# Patient Record
Sex: MALE | Race: WHITE | Employment: FULL TIME | URBAN - NONMETROPOLITAN AREA
[De-identification: names, ages, dates, MRNs, and addresses within clinical notes are randomized per-mention and may not be internally consistent; named-entity substitution may affect disease eponyms.]

---

## 2017-08-23 ENCOUNTER — APPOINTMENT (OUTPATIENT)
Dept: GENERAL RADIOLOGY | Age: 22
End: 2017-08-23
Payer: COMMERCIAL

## 2017-08-23 ENCOUNTER — HOSPITAL ENCOUNTER (EMERGENCY)
Age: 22
Discharge: HOME | End: 2017-08-23
Payer: COMMERCIAL

## 2017-08-23 VITALS
DIASTOLIC BLOOD PRESSURE: 73 MMHG | HEART RATE: 75 BPM | OXYGEN SATURATION: 98 % | RESPIRATION RATE: 18 BRPM | BODY MASS INDEX: 20.99 KG/M2 | HEIGHT: 72 IN | TEMPERATURE: 98.6 F | SYSTOLIC BLOOD PRESSURE: 116 MMHG | WEIGHT: 155 LBS

## 2017-08-23 DIAGNOSIS — S39.012D LUMBAR STRAIN, SUBSEQUENT ENCOUNTER: Primary | ICD-10-CM

## 2017-08-23 PROCEDURE — 99283 EMERGENCY DEPT VISIT LOW MDM: CPT

## 2017-08-23 PROCEDURE — 72100 X-RAY EXAM L-S SPINE 2/3 VWS: CPT

## 2017-08-23 RX ORDER — METHYLPREDNISOLONE 4 MG/1
TABLET ORAL
Qty: 1 KIT | Refills: 0 | Status: SHIPPED | OUTPATIENT
Start: 2017-08-23 | End: 2017-08-29

## 2017-08-23 RX ORDER — CYCLOBENZAPRINE HCL 10 MG
5-10 TABLET ORAL 3 TIMES DAILY PRN
Qty: 15 TABLET | Refills: 0 | Status: SHIPPED | OUTPATIENT
Start: 2017-08-23 | End: 2017-09-02

## 2017-08-23 ASSESSMENT — ENCOUNTER SYMPTOMS
SHORTNESS OF BREATH: 0
WHEEZING: 0
DIARRHEA: 0
SORE THROAT: 0
COUGH: 0
VOMITING: 0
ABDOMINAL PAIN: 0
BLOOD IN STOOL: 0
BACK PAIN: 1
NAUSEA: 0

## 2017-08-23 ASSESSMENT — PAIN DESCRIPTION - PAIN TYPE: TYPE: ACUTE PAIN

## 2017-08-23 ASSESSMENT — PAIN DESCRIPTION - FREQUENCY: FREQUENCY: CONTINUOUS

## 2017-08-23 ASSESSMENT — PAIN DESCRIPTION - DESCRIPTORS: DESCRIPTORS: CONSTANT

## 2017-08-23 ASSESSMENT — PAIN DESCRIPTION - ORIENTATION: ORIENTATION: LOWER;RIGHT;LEFT

## 2017-08-23 ASSESSMENT — PAIN SCALES - GENERAL: PAINLEVEL_OUTOF10: 7

## 2017-08-23 ASSESSMENT — PAIN DESCRIPTION - LOCATION: LOCATION: BACK

## 2021-10-30 LAB
C-REACTIVE PROTEIN: 9.9 MG/DL (ref 0–1)
ERYTHROCYTE SEDIMENTATION RATE: 20 MM/HR (ref 0–15)

## 2023-06-08 ENCOUNTER — CLINICAL DOCUMENTATION (OUTPATIENT)
Dept: INTERNAL MEDICINE CLINIC | Age: 28
End: 2023-06-08

## 2023-06-08 NOTE — PROGRESS NOTES
Patient contacted suzie to complete new patient screening. Patient was  referred by [facility]. Patient reports that he is active in IOP with [facility]. Patient reports that he has been using heroin/ fentanyl since he was 20. Patient reports that he hasn't been using very much lately. Patient reports taking suboxone off the street 8mg 2 weeks ago. Patient reports that he used yesterday about a tenth.  Suzie was able to schedule on 6/12@ 2:30p

## 2023-06-19 ENCOUNTER — OFFICE VISIT (OUTPATIENT)
Dept: INTERNAL MEDICINE CLINIC | Age: 28
End: 2023-06-19
Payer: COMMERCIAL

## 2023-06-19 VITALS
WEIGHT: 164 LBS | SYSTOLIC BLOOD PRESSURE: 119 MMHG | HEIGHT: 73 IN | BODY MASS INDEX: 21.74 KG/M2 | DIASTOLIC BLOOD PRESSURE: 77 MMHG | HEART RATE: 57 BPM

## 2023-06-19 DIAGNOSIS — F11.20 SEVERE OPIOID USE DISORDER (HCC): Primary | ICD-10-CM

## 2023-06-19 LAB
ALCOHOL URINE: ABNORMAL
AMPHETAMINE SCREEN, URINE: ABNORMAL
BARBITURATE SCREEN, URINE: ABNORMAL
BENZODIAZEPINE SCREEN, URINE: ABNORMAL
BUPRENORPHINE URINE: ABNORMAL
COCAINE METABOLITE SCREEN URINE: ABNORMAL
FENTANYL SCREEN, URINE: ABNORMAL
GABAPENTIN SCREEN, URINE: ABNORMAL
MDMA URINE: ABNORMAL
METHADONE SCREEN, URINE: ABNORMAL
METHAMPHETAMINE, URINE: ABNORMAL
OPIATE SCREEN URINE: ABNORMAL
OXYCODONE SCREEN URINE: ABNORMAL
PHENCYCLIDINE SCREEN URINE: ABNORMAL
PROPOXYPHENE SCREEN, URINE: ABNORMAL
SYNTHETIC CANNABINOIDS(K2) SCREEN, URINE: ABNORMAL
THC SCREEN, URINE: ABNORMAL
TRAMADOL SCREEN URINE: ABNORMAL
TRICYCLIC ANTIDEPRESSANTS, UR: ABNORMAL

## 2023-06-19 PROCEDURE — 4004F PT TOBACCO SCREEN RCVD TLK: CPT | Performed by: NURSE PRACTITIONER

## 2023-06-19 PROCEDURE — 99214 OFFICE O/P EST MOD 30 MIN: CPT | Performed by: NURSE PRACTITIONER

## 2023-06-19 PROCEDURE — G8420 CALC BMI NORM PARAMETERS: HCPCS | Performed by: NURSE PRACTITIONER

## 2023-06-19 PROCEDURE — G8427 DOCREV CUR MEDS BY ELIG CLIN: HCPCS | Performed by: NURSE PRACTITIONER

## 2023-06-19 PROCEDURE — 80305 DRUG TEST PRSMV DIR OPT OBS: CPT | Performed by: NURSE PRACTITIONER

## 2023-06-19 RX ORDER — BUPRENORPHINE AND NALOXONE 8; 2 MG/1; MG/1
1 FILM, SOLUBLE BUCCAL; SUBLINGUAL 2 TIMES DAILY
Qty: 28 FILM | Refills: 0 | Status: SHIPPED | OUTPATIENT
Start: 2023-06-19 | End: 2023-07-03

## 2023-06-19 ASSESSMENT — ENCOUNTER SYMPTOMS
WHEEZING: 0
CONSTIPATION: 0
COUGH: 0
NAUSEA: 0
DIARRHEA: 0
VOMITING: 0
CHEST TIGHTNESS: 0
ABDOMINAL PAIN: 0
SHORTNESS OF BREATH: 0

## 2023-06-19 NOTE — PROGRESS NOTES
Verbal order per Elin Corcoran CNP for urine drug screen. Positive for BUP THC.  Verified results with Jenn Garcia RN.

## 2023-06-19 NOTE — PROGRESS NOTES
06/19/23   The patients primary care physician is None None    Myra Vail is a 34 y.o.  male who presents in office today for follow up medication assisted treatment, substance use disorder. Pt denies any urges, triggers, or cravings. States he feels stable on suboxone 8mg bid. He continues IOP 3 days a week through [facility]    Pt is keeping medications in a lock box. UDS acceptable    Awaiting labs      Pertinent Drug History  Pt started using  fentanyl at the age of 27  Date and time of last use: fentanyl IV 3 days ago 6/9/23, meth 6/6/23  Othersubstances: crack/cocaine  Prior attempts at quitting: Tx through [facility] 4 months ago. Has been \"in and out\" of tx at [facility] for 2 years  Prior uses of buprenorphine/naltrexone: was on it for a few months consistently in 2021 - has been buying off the streets off and on since then when trying to quit on his own  Psychiatric history: denies hx or sx of depression or anxiety  Social hx-lives with his girlfriend and 2 children ages 11 and 3  Girlfriend has no hx of substance abuse. - they have been together for 8 years  She is supportive and aware of his relapse.    Past Medical History:   Diagnosis Date    Anxiety     Liver disease     Substance abuse ([facility] [address]          Social History     Socioeconomic History    Marital status: Single     Spouse name: Not on file    Number of children: Not on file    Years of education: Not on file    Highest education level: Not on file   Occupational History    Not on file   Tobacco Use    Smoking status: Some Days     Years: 20.00     Types: Cigarettes    Smokeless tobacco: Current   Vaping Use    Vaping Use: Every day    Substances: Nicotine   Substance and Sexual Activity    Alcohol use: Not Currently     Comment: social drinker    Drug use: Yes     Types: Fentanyl     Comment: last used fentanyl 6/8/23    Sexual activity: Yes     Partners: Female   Other Topics Concern    Not on file   Social History

## 2023-07-03 ENCOUNTER — OFFICE VISIT (OUTPATIENT)
Dept: INTERNAL MEDICINE CLINIC | Age: 28
End: 2023-07-03
Payer: MEDICAID

## 2023-07-03 VITALS
SYSTOLIC BLOOD PRESSURE: 121 MMHG | DIASTOLIC BLOOD PRESSURE: 82 MMHG | HEART RATE: 44 BPM | WEIGHT: 162 LBS | BODY MASS INDEX: 21.47 KG/M2 | RESPIRATION RATE: 18 BRPM | HEIGHT: 73 IN

## 2023-07-03 DIAGNOSIS — R00.1 BRADYCARDIA: ICD-10-CM

## 2023-07-03 DIAGNOSIS — I49.9 IRREGULAR HEART RATE: Primary | ICD-10-CM

## 2023-07-03 DIAGNOSIS — F11.20 SEVERE OPIOID USE DISORDER (HCC): ICD-10-CM

## 2023-07-03 PROCEDURE — 93000 ELECTROCARDIOGRAM COMPLETE: CPT | Performed by: NURSE PRACTITIONER

## 2023-07-03 PROCEDURE — 99214 OFFICE O/P EST MOD 30 MIN: CPT | Performed by: NURSE PRACTITIONER

## 2023-07-03 PROCEDURE — G8420 CALC BMI NORM PARAMETERS: HCPCS | Performed by: NURSE PRACTITIONER

## 2023-07-03 PROCEDURE — G8427 DOCREV CUR MEDS BY ELIG CLIN: HCPCS | Performed by: NURSE PRACTITIONER

## 2023-07-03 PROCEDURE — 4004F PT TOBACCO SCREEN RCVD TLK: CPT | Performed by: NURSE PRACTITIONER

## 2023-07-03 PROCEDURE — 80305 DRUG TEST PRSMV DIR OPT OBS: CPT | Performed by: NURSE PRACTITIONER

## 2023-07-03 RX ORDER — BUPRENORPHINE AND NALOXONE 8; 2 MG/1; MG/1
1 FILM, SOLUBLE BUCCAL; SUBLINGUAL 2 TIMES DAILY
Qty: 28 FILM | Refills: 0 | Status: SHIPPED | OUTPATIENT
Start: 2023-07-03 | End: 2023-07-17

## 2023-07-03 ASSESSMENT — ENCOUNTER SYMPTOMS
COUGH: 0
CHEST TIGHTNESS: 0
NAUSEA: 0
SHORTNESS OF BREATH: 0
DIARRHEA: 0
WHEEZING: 0
CONSTIPATION: 0
VOMITING: 0
ABDOMINAL PAIN: 0

## 2023-07-03 NOTE — PROGRESS NOTES
07/03/23   The patients primary care physician is None None    Markos Parikh is a 34 y.o.  male who presents in office today for follow up medication assisted treatment, substance use disorder. Pt established care on 6/12    Pt denies any urges, triggers, or cravings. States he feels stable on suboxone 8mg bid. He continues IOP 3 days a week through [facility]     Pt is keeping medications in a lock box. UDS acceptable     He is bradycardic in office. EKG completed. Pt is asymptomatic. Denies dizziness, syncope, or pain. States \" I feel fine\"   He declines any additional work up but does agree to have labs completed after this visit. He agrees to present to the ED for any onset of symptoms. Pertinent Drug History  Pt started using  fentanyl at the age of 27  Date and time of last use: fentanyl IV 3 days ago 6/9/23, meth 6/6/23  Othersubstances: crack/cocaine  Prior attempts at quitting: Tx through [facility] 4 months ago. Has been \"in and out\" of tx at [facility] for 2 years  Prior uses of buprenorphine/naltrexone: was on it for a few months consistently in 2021 - has been buying off the streets off and on since then when trying to quit on his own  Psychiatric history: denies hx or sx of depression or anxiety  Social hx-lives with his girlfriend and 2 children ages 11 and 3  Girlfriend has no hx of substance abuse. - they have been together for 8 years  She is supportive and aware of his relapse.    Past Medical History:   Diagnosis Date    Anxiety     Liver disease     Substance abuse ([address])          Social History     Socioeconomic History    Marital status: Single     Spouse name: Not on file    Number of children: Not on file    Years of education: Not on file    Highest education level: Not on file   Occupational History    Not on file   Tobacco Use    Smoking status: Some Days     Years: 20.00     Types: Cigarettes    Smokeless tobacco: Current   Vaping Use    Vaping Use: Every

## 2023-07-17 ENCOUNTER — OFFICE VISIT (OUTPATIENT)
Dept: INTERNAL MEDICINE CLINIC | Age: 28
End: 2023-07-17
Payer: MEDICAID

## 2023-07-17 VITALS
BODY MASS INDEX: 21.47 KG/M2 | HEART RATE: 51 BPM | WEIGHT: 162 LBS | HEIGHT: 73 IN | DIASTOLIC BLOOD PRESSURE: 84 MMHG | SYSTOLIC BLOOD PRESSURE: 130 MMHG

## 2023-07-17 DIAGNOSIS — F11.20 SEVERE OPIOID USE DISORDER (HCC): Primary | ICD-10-CM

## 2023-07-17 PROCEDURE — 80305 DRUG TEST PRSMV DIR OPT OBS: CPT | Performed by: NURSE PRACTITIONER

## 2023-07-17 PROCEDURE — 99214 OFFICE O/P EST MOD 30 MIN: CPT | Performed by: NURSE PRACTITIONER

## 2023-07-17 PROCEDURE — G8420 CALC BMI NORM PARAMETERS: HCPCS | Performed by: NURSE PRACTITIONER

## 2023-07-17 PROCEDURE — 4004F PT TOBACCO SCREEN RCVD TLK: CPT | Performed by: NURSE PRACTITIONER

## 2023-07-17 PROCEDURE — G8428 CUR MEDS NOT DOCUMENT: HCPCS | Performed by: NURSE PRACTITIONER

## 2023-07-17 RX ORDER — BUPRENORPHINE AND NALOXONE 8; 2 MG/1; MG/1
1 FILM, SOLUBLE BUCCAL; SUBLINGUAL 2 TIMES DAILY
Qty: 28 FILM | Refills: 0 | Status: SHIPPED | OUTPATIENT
Start: 2023-07-17 | End: 2023-07-31

## 2023-07-17 NOTE — PROGRESS NOTES
Verbal order per Rian Ayala CNP for urine drug screen. Positive for BUP THC. Verified results with Luiza VILLELA LPN.
drinker    Drug use: Yes     Types: Fentanyl     Comment: last used fentanyl 6/8/23    Sexual activity: Yes     Partners: Female   Other Topics Concern    Not on file   Social History Narrative    Not on file     Social Determinants of Health     Financial Resource Strain: Not on file   Food Insecurity: Not on file   Transportation Needs: Not on file   Physical Activity: Not on file   Stress: Not on file   Social Connections: Not on file   Intimate Partner Violence: Not on file   Housing Stability: Not on file         Current Outpatient Medications on File Prior to Visit   Medication Sig Dispense Refill    buprenorphine-naloxone (SUBOXONE) 8-2 MG FILM SL film Place 1 Film under the tongue in the morning and at bedtime for 14 days. Max Daily Amount: 2 Film 28 Film 0     No current facility-administered medications on file prior to visit. Review of Systems      Physical Exam    Vitals:    07/17/23 1421   BP: 130/84   Pulse: 51        Cognition: alert, oriented to person, place, and time  Appearance: appropriate, no acute distress, does not appear intoxicated or in withdrawal  Memory: Normal  Behavioral/motor: normal  Affect: congruent  Attitude toward examiner: respectful, pleasant  Thought content: no delusions, hallucination, Denies suicidal ideation or intent  Insight: fair  Judgement: fair  Eyes: pupils normal  Skin: no rashes, no track marks noted        There is no problem list on file for this patient. PDMP Monitoring:    Last PDMP Jagdish Arana as Reviewed [facility]):  Review User Review Instant Review Result   Marina Brenna 6/19/2023  2:34 PM Reviewed PDMP [1]           I reviewed the [place] Automated Rx Reporting System report     There does not appear to be any discrepancies or overprescribing of controlled substances    GOAL:  To enhance patient recovery through the use of medication assisted treatment to improve overall quality of life.       OBJECTIVE:  Patient will abstain from the use of mood altering

## 2023-07-31 ENCOUNTER — OFFICE VISIT (OUTPATIENT)
Dept: INTERNAL MEDICINE CLINIC | Age: 28
End: 2023-07-31
Payer: MEDICAID

## 2023-07-31 VITALS
HEIGHT: 73 IN | HEART RATE: 45 BPM | RESPIRATION RATE: 16 BRPM | WEIGHT: 166 LBS | DIASTOLIC BLOOD PRESSURE: 75 MMHG | SYSTOLIC BLOOD PRESSURE: 122 MMHG | BODY MASS INDEX: 22 KG/M2

## 2023-07-31 DIAGNOSIS — F11.20 SEVERE OPIOID USE DISORDER (HCC): Primary | ICD-10-CM

## 2023-07-31 PROCEDURE — G8427 DOCREV CUR MEDS BY ELIG CLIN: HCPCS | Performed by: NURSE PRACTITIONER

## 2023-07-31 PROCEDURE — 80305 DRUG TEST PRSMV DIR OPT OBS: CPT | Performed by: NURSE PRACTITIONER

## 2023-07-31 PROCEDURE — G8420 CALC BMI NORM PARAMETERS: HCPCS | Performed by: NURSE PRACTITIONER

## 2023-07-31 PROCEDURE — 4004F PT TOBACCO SCREEN RCVD TLK: CPT | Performed by: NURSE PRACTITIONER

## 2023-07-31 PROCEDURE — 99214 OFFICE O/P EST MOD 30 MIN: CPT | Performed by: NURSE PRACTITIONER

## 2023-07-31 RX ORDER — BUPRENORPHINE AND NALOXONE 8; 2 MG/1; MG/1
1 FILM, SOLUBLE BUCCAL; SUBLINGUAL 2 TIMES DAILY
Qty: 28 FILM | Refills: 0 | Status: CANCELLED | OUTPATIENT
Start: 2023-07-31 | End: 2023-08-14

## 2023-07-31 RX ORDER — BUPRENORPHINE AND NALOXONE 8; 2 MG/1; MG/1
1 FILM, SOLUBLE BUCCAL; SUBLINGUAL 2 TIMES DAILY
Qty: 14 FILM | Refills: 0 | Status: SHIPPED | OUTPATIENT
Start: 2023-07-31 | End: 2023-08-07

## 2023-07-31 RX ORDER — BUPRENORPHINE 100 MG/1
100 SOLUTION SUBCUTANEOUS
Qty: 0.5 ML | Refills: 5 | Status: SHIPPED | OUTPATIENT
Start: 2023-07-31 | End: 2023-08-28

## 2023-07-31 NOTE — PROGRESS NOTES
Verbal order per Zainab Nelson CNP for urine drug screen. Positive for BUP and THC. Verified results with AMINAH Jarrett
Encounter   Procedures    POCT 615 Steven Johnson, APRN - CNP 07/31/23 3:42 PM

## 2023-08-07 ENCOUNTER — OFFICE VISIT (OUTPATIENT)
Dept: INTERNAL MEDICINE CLINIC | Age: 28
End: 2023-08-07
Payer: MEDICAID

## 2023-08-07 VITALS
BODY MASS INDEX: 22.13 KG/M2 | HEART RATE: 81 BPM | DIASTOLIC BLOOD PRESSURE: 74 MMHG | SYSTOLIC BLOOD PRESSURE: 124 MMHG | WEIGHT: 167 LBS | HEIGHT: 73 IN

## 2023-08-07 DIAGNOSIS — F11.20 SEVERE OPIOID USE DISORDER (HCC): Primary | ICD-10-CM

## 2023-08-07 PROCEDURE — 4004F PT TOBACCO SCREEN RCVD TLK: CPT | Performed by: NURSE PRACTITIONER

## 2023-08-07 PROCEDURE — 80305 DRUG TEST PRSMV DIR OPT OBS: CPT | Performed by: NURSE PRACTITIONER

## 2023-08-07 PROCEDURE — G8420 CALC BMI NORM PARAMETERS: HCPCS | Performed by: NURSE PRACTITIONER

## 2023-08-07 PROCEDURE — 99214 OFFICE O/P EST MOD 30 MIN: CPT | Performed by: NURSE PRACTITIONER

## 2023-08-07 PROCEDURE — G8427 DOCREV CUR MEDS BY ELIG CLIN: HCPCS | Performed by: NURSE PRACTITIONER

## 2023-08-07 NOTE — PROGRESS NOTES
08/07/23   The patients primary care physician is None None    Angela Olson is a 34 y.o.  male who presents in office today for follow up medication assisted treatment, substance use disorder. Pt established care on 6/12     Pt denies any urges, triggers, or cravings. States he feels stable on suboxone 8mg bid. He continues IOP 3 days a week through [facility]    He will received his first subcutaneous buprenorphine injection today. He is working at [employer]. He enjoys working there. Pertinent Drug History  Pt started using  fentanyl at the age of 27  Date and time of last use: fentanyl IV 3 days ago 6/9/23, meth 6/6/23  Othersubstances: crack/cocaine  Prior attempts at quitting: Tx through [facility] 4 months ago. Has been \"in and out\" of tx at [facility] for 2 years  Prior uses of buprenorphine/naltrexone: was on it for a few months consistently in 2021 - has been buying off the streets off and on since then when trying to quit on his own  Psychiatric history: denies hx or sx of depression or anxiety  Social hx-lives with his girlfriend and 2 children ages 11 and 3  Girlfriend has no hx of substance abuse. - they have been together for 8 years  She is supportive and aware of his relapse  Past Medical History:   Diagnosis Date    Anxiety     Liver disease     Substance abuse ([address])          Social History     Socioeconomic History    Marital status: Single     Spouse name: Not on file    Number of children: Not on file    Years of education: Not on file    Highest education level: Not on file   Occupational History    Not on file   Tobacco Use    Smoking status: Some Days     Years: 20.00     Types: Cigarettes    Smokeless tobacco: Current   Vaping Use    Vaping Use: Every day    Substances: Nicotine   Substance and Sexual Activity    Alcohol use: Not Currently     Comment: social drinker    Drug use: Yes     Types: Fentanyl     Comment: last used fentanyl 6/8/23    Sexual activity: Yes

## 2023-08-07 NOTE — PROGRESS NOTES
Verbal order per Amy Whitten CNP for urine drug screen. Positive for BUP THC. Verified results with Luiza VILLELA LPN.

## 2023-09-06 ENCOUNTER — NURSE ONLY (OUTPATIENT)
Dept: INTERNAL MEDICINE CLINIC | Age: 28
End: 2023-09-06
Payer: MEDICAID

## 2023-09-06 VITALS
SYSTOLIC BLOOD PRESSURE: 128 MMHG | HEART RATE: 80 BPM | HEIGHT: 73 IN | WEIGHT: 158 LBS | DIASTOLIC BLOOD PRESSURE: 71 MMHG | BODY MASS INDEX: 20.94 KG/M2

## 2023-09-06 DIAGNOSIS — F11.20 SEVERE OPIOID USE DISORDER (HCC): Primary | ICD-10-CM

## 2023-09-06 PROCEDURE — 99214 OFFICE O/P EST MOD 30 MIN: CPT | Performed by: NURSE PRACTITIONER

## 2023-09-06 PROCEDURE — 80305 DRUG TEST PRSMV DIR OPT OBS: CPT | Performed by: NURSE PRACTITIONER

## 2023-09-06 NOTE — PROGRESS NOTES
09/06/23   The patients primary care physician is None None    Orvil Leatha is a 34 y.o.  male who presents in office today for follow up medication assisted treatment, substance use disorder. Pt established care on 6/12     He received his first subcutaneous buprenorphine injection on 8/7/23. Pt denies any urges, triggers, or cravings. No sx of withdrawal. Reports tolerating medication well. Pt states he has come a long way, working, visiting his kids everyday,and feels happy. UDS acceptable    He does attend weekly groups and counseling services. Pt aware of need for labs    Pertinent Drug History  Pt started using  fentanyl at the age of 27  Date and time of last use: fentanyl IV 3 days ago 6/9/23, meth 6/6/23  Othersubstances: crack/cocaine  Prior attempts at quitting: Tx through [facility] 4 months ago.    Has been \"in and out\" of tx at [facility] for 2 years  Prior uses of buprenorphine/naltrexone: was on it for a few months consistently in 2021 - has been buying off the streets off and on since then when trying to quit on his own  Psychiatric history: denies hx or sx of depression or anxiety  Past Medical History:   Diagnosis Date    Anxiety     Liver disease     Substance abuse ([address])          Social History     Socioeconomic History    Marital status: Single     Spouse name: Not on file    Number of children: Not on file    Years of education: Not on file    Highest education level: Not on file   Occupational History    Not on file   Tobacco Use    Smoking status: Some Days     Years: 20.00     Types: Cigarettes    Smokeless tobacco: Current   Vaping Use    Vaping Use: Every day    Substances: Nicotine   Substance and Sexual Activity    Alcohol use: Not Currently     Comment: social drinker    Drug use: Yes     Types: Fentanyl     Comment: last used fentanyl 6/8/23    Sexual activity: Yes     Partners: Female   Other Topics Concern    Not on file   Social History Narrative    Not

## 2023-09-06 NOTE — PROGRESS NOTES
Verbal order per Patel Gomez CNP for urine drug screen. Positive for BUP THC. Verified results with Luiza VILLELA LPN.

## 2023-09-06 NOTE — PROGRESS NOTES
Patient stated he has not consumed any Fentanyl or opiates in the last 2 weeks. Patient was educated on the risk of precipitated withdrawals symptoms that could take place with receiving the injections. Patient verbalized understanding. Consent signed at this time. Sublocade 300 mg SQ injection given into RLQ. Patient tolerated well. No adverse reaction noticed.

## 2023-09-13 ENCOUNTER — TELEPHONE (OUTPATIENT)
Dept: INTERNAL MEDICINE CLINIC | Age: 28
End: 2023-09-13

## 2023-09-13 NOTE — TELEPHONE ENCOUNTER
Alex Neves from [facility] called to ask if patient was receiving medication and still getting treatment with us. This nurse stated to her that I would need a consent signed before any information can be released to her. Will fax over a release for patient to sign.

## 2025-03-28 ENCOUNTER — HOSPITAL ENCOUNTER (OUTPATIENT)
Age: 30
Discharge: HOME | End: 2025-03-28
Payer: MEDICAID

## 2025-03-28 LAB
ALBUMIN SERPL BCG-MCNC: 4.8 G/DL (ref 3.4–4.9)
ALP SERPL-CCNC: 84 U/L (ref 40–129)
ALT SERPL W/O P-5'-P-CCNC: 21 U/L (ref 10–50)
AST SERPL-CCNC: 33 U/L (ref 10–50)
BILIRUB CONJ SERPL-MCNC: 0.2 MG/DL (ref 0–0.2)
BILIRUB SERPL-MCNC: 0.3 MG/DL (ref 0.3–1.2)
DEPRECATED RDW RBC AUTO: 42.3 FL (ref 35–45)
ERYTHROCYTE [DISTWIDTH] IN BLOOD BY AUTOMATED COUNT: 13.9 % (ref 11.5–14.5)
HCT VFR BLD AUTO: 40.4 % (ref 42–52)
HGB BLD-MCNC: 13.6 GM/DL (ref 14–18)
MCH RBC QN AUTO: 27.9 PG (ref 26–33)
MCHC RBC AUTO-ENTMCNC: 33.7 GM/DL (ref 32.2–35.5)
MCV RBC AUTO: 83 FL (ref 80–94)
PLATELET # BLD AUTO: 203 THOU/MM3 (ref 130–400)
PMV BLD AUTO: 11.4 FL (ref 9.4–12.4)
PROT SERPL-MCNC: 7.5 G/DL (ref 6.4–8.3)
RBC # BLD AUTO: 4.87 MILL/MM3 (ref 4.7–6.1)
WBC # BLD AUTO: 6.1 THOU/MM3 (ref 4.8–10.8)

## 2025-03-28 PROCEDURE — 86704 HEP B CORE ANTIBODY TOTAL: CPT

## 2025-03-28 PROCEDURE — 80076 HEPATIC FUNCTION PANEL: CPT

## 2025-03-28 PROCEDURE — 86706 HEP B SURFACE ANTIBODY: CPT

## 2025-03-28 PROCEDURE — 36415 COLL VENOUS BLD VENIPUNCTURE: CPT

## 2025-03-28 PROCEDURE — 86708 HEPATITIS A ANTIBODY: CPT

## 2025-03-28 PROCEDURE — 87389 HIV-1 AG W/HIV-1&-2 AB AG IA: CPT

## 2025-03-28 PROCEDURE — 87902 NFCT AGT GNTYP ALYS HEP C: CPT

## 2025-03-28 PROCEDURE — 86709 HEPATITIS A IGM ANTIBODY: CPT

## 2025-03-28 PROCEDURE — 86803 HEPATITIS C AB TEST: CPT

## 2025-03-28 PROCEDURE — 87340 HEPATITIS B SURFACE AG IA: CPT

## 2025-03-28 PROCEDURE — 85027 COMPLETE CBC AUTOMATED: CPT

## 2025-03-28 PROCEDURE — 87522 HEPATITIS C REVRS TRNSCRPJ: CPT

## 2025-03-29 LAB
HAV AB SER-ACNC: REACTIVE
HBV CORE IGG+IGM SERPL QL IA: REACTIVE
HBV SURFACE AB TITR SER: > 1000 MIU/ML
HBV SURFACE AG SERPL QL IA: NONREACTIVE
HCV IGG SERPL QL IA: ABNORMAL
HIV 1+2 AB+HIV1 P24 AG SERPL QL IA: NORMAL

## 2025-03-30 LAB — HAV IGM SER QL: NONREACTIVE

## 2025-04-01 LAB
HCV RNA # SERPL NAA+PROBE: NOT DETECTED {COPIES}/ML
SPECIMEN SOURCE: NORMAL

## 2025-04-03 LAB — HCV GENTYP SERPL SEQ: NORMAL
